# Patient Record
Sex: FEMALE | Race: WHITE | URBAN - METROPOLITAN AREA
[De-identification: names, ages, dates, MRNs, and addresses within clinical notes are randomized per-mention and may not be internally consistent; named-entity substitution may affect disease eponyms.]

---

## 2017-07-08 ENCOUNTER — OFFICE VISIT (OUTPATIENT)
Dept: URGENT CARE | Facility: URGENT CARE | Age: 52
End: 2017-07-08
Payer: COMMERCIAL

## 2017-07-08 VITALS
HEART RATE: 84 BPM | WEIGHT: 221 LBS | TEMPERATURE: 97.8 F | BODY MASS INDEX: 35.67 KG/M2 | SYSTOLIC BLOOD PRESSURE: 113 MMHG | DIASTOLIC BLOOD PRESSURE: 67 MMHG

## 2017-07-08 DIAGNOSIS — L25.9 CONTACT DERMATITIS, UNSPECIFIED CONTACT DERMATITIS TYPE, UNSPECIFIED TRIGGER: ICD-10-CM

## 2017-07-08 DIAGNOSIS — H02.849 SWOLLEN EYELID, UNSPECIFIED LATERALITY: Primary | ICD-10-CM

## 2017-07-08 PROCEDURE — 99213 OFFICE O/P EST LOW 20 MIN: CPT | Performed by: FAMILY MEDICINE

## 2017-07-08 NOTE — MR AVS SNAPSHOT
"              After Visit Summary   7/8/2017    Francine Romo    MRN: [id number]           Patient Information     Date Of Birth          1965        Visit Information        Provider Department      7/8/2017 9:10 AM Joni Dyson MD [facility]        Today's Diagnoses     Swollen eyelid, unspecified laterality    -  1    Contact dermatitis, unspecified contact dermatitis type, unspecified trigger          Care Instructions    Take over the counter loratadine ( generic claritin  ) once daily for the next couple weeks    Use benadryl ( diphenhydramine ) cream as needed to affected areas - topical antihistamine    Could use over the counter hydrocortisone cream as needed for short period of time ( mild steroid cream )    See allergist if symptoms not resolving          Follow-ups after your visit        Who to contact     If you have questions or need follow up information about today's clinic visit or your schedule please contact [facility] directly at [phone].  Normal or non-critical lab and imaging results will be communicated to you by MyChart, letter or phone within 4 business days after the clinic has received the results. If you do not hear from us within 7 days, please contact the clinic through MyChart or phone. If you have a critical or abnormal lab result, we will notify you by phone as soon as possible.  Submit refill requests through [employer] or call your pharmacy and they will forward the refill request to us. Please allow 3 business days for your refill to be completed.          Additional Information About Your Visit        [facility] Information     [employer] lets you send messages to your doctor, view your test results, renew your prescriptions, schedule appointments and more. To sign up, go to [email or web address] . Click on \"Log in\" on the left side of the screen, which will take you to the Welcome page. Then click on \"Sign up Now\" on the right side of the " page.     You will be asked to enter the access code listed below, as well as some personal information. Please follow the directions to create your username and password.     Your access code is: [email or web address]  Expires: 10/6/2017 10:19 AM     Your access code will  in 90 days. If you need help or a new code, please call your [facility] clinic or [phone].        Care EveryWhere ID     This is your Care EveryWhere ID. This could be used by other organizations to access your [facility] medical records  [id number]        Your Vitals Were     Pulse Temperature BMI (Body Mass Index)             84 97.8  F (36.6  C) (Oral) 35.67 kg/m2          Blood Pressure from Last 3 Encounters:   17 113/67   14 142/82    Weight from Last 3 Encounters:   17 221 lb (100.2 kg)   14 278 lb (126.1 kg)              Today, you had the following     No orders found for display       Primary Care Provider    [facility] Other Clinic                Equal Access to Services     [facility]: Hadii aad ku hadasho Soomaali, waaxda luqadaha, qaybta kaalmada adeegyada, waxay garrickin poli downing . So [facility] [phone].    ATENCIÓN: Si habla español, tiene a hughes disposición servicios gratuitos de asistencia lingüística. Llame al [phone].    We comply with applicable federal civil rights laws and [place] laws. We do not discriminate on the basis of race, color, national origin, age, disability sex, sexual orientation or gender identity.            Thank you!     [facility]. Our goal is always to provide you with excellent care. Hearing back from our patients is one way we can continue to improve our services. Please take a few minutes to complete the written survey that you may receive in the mail after your visit with us. Thank you!             Your Updated Medication List - Protect others around you: Learn how to safely use, store and throw away your  medicines at www.disposemymeds.org.          This list is accurate as of: 7/8/17 10:20 AM.  Always use your most recent med list.                   Brand Name Dispense Instructions for use Diagnosis    azithromycin 250 MG tablet    ZITHROMAX    6 tablet    Two tablets first day, then one tablet daily for four days    Exposure to pertussis       lidocaine visc 2% 2.5mL/5mL & maalox/mylanta w/ simeth 2.5mL/5mL & diphenhydrAMINE 5mg/5mL Susp suspension    [facility]    250 mL    Swish and swallow 5 mLs in mouth every 6 hours as needed for sore throat pharmacist discretion on formula in stock    Acute pharyngitis       norethindrone-ethinyl estradiol-iron 1.5-30 MG-MCG per tablet    MICROGESTIN FE1.5/30     Take 1 tablet by mouth daily        SYNTHROID PO      Take 100 mcg by mouth

## 2017-07-08 NOTE — PATIENT INSTRUCTIONS
Take over the counter loratadine ( generic claritin  ) once daily for the next couple weeks    Use benadryl ( diphenhydramine ) cream as needed to affected areas - topical antihistamine    Could use over the counter hydrocortisone cream as needed for short period of time ( mild steroid cream )    See allergist if symptoms not resolving

## 2017-07-08 NOTE — PROGRESS NOTES
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  Unknown allergen.   Plan: treat symptomatically.  Allergy testing if needed, patient could see allergist for this.     (L25.9) Contact dermatitis, unspecified contact dermatitis type, unspecified trigger  Comment: as above   Plan: see AVS      I reviewed the patient's medications, allergies, medical history, family history, and social history.    Joni Dyson MD

## 2017-07-08 NOTE — NURSING NOTE
"Chief Complaint   Patient presents with     Eye Lid Swelling       Initial /67  Pulse 84  Temp 97.8  F (36.6  C) (Oral)  Wt 221 lb (100.2 kg)  BMI 35.67 kg/m2 Estimated body mass index is 35.67 kg/(m^2) as calculated from the following:    Height as of 5/14/14: 5' 6\" (1.676 m).    Weight as of this encounter: 221 lb (100.2 kg).  Medication Reconciliation: complete     Hardy Flower CMA      "